# Patient Record
Sex: MALE | URBAN - METROPOLITAN AREA
[De-identification: names, ages, dates, MRNs, and addresses within clinical notes are randomized per-mention and may not be internally consistent; named-entity substitution may affect disease eponyms.]

---

## 2019-12-06 ENCOUNTER — APPOINTMENT (RX ONLY)
Dept: URBAN - METROPOLITAN AREA CLINIC 38 | Facility: CLINIC | Age: 29
Setting detail: DERMATOLOGY
End: 2019-12-06

## 2019-12-06 DIAGNOSIS — L73.8 OTHER SPECIFIED FOLLICULAR DISORDERS: ICD-10-CM

## 2019-12-06 PROCEDURE — 99202 OFFICE O/P NEW SF 15 MIN: CPT

## 2019-12-06 NOTE — HPI: FREE FORM (INITIAL HISTORY)
What Brings You In Today? (This Is An Xx Year Old Patient Who Presents For...): Ingrown hair
When Did You First Notice It? (The Patient First Noticed It...): Three weeks ago
Where On Your Body Is It? (Located On...): Face
What Happened Since That Time? (Since That Time...): He popped it but it keeps coming back
Did Anything Happen To Make You Want To Come In For This Specifically Today? (The Specific Reason That The Patient Came In Today Was Because:): Evaluation and treatment